# Patient Record
Sex: FEMALE | Race: OTHER | URBAN - METROPOLITAN AREA
[De-identification: names, ages, dates, MRNs, and addresses within clinical notes are randomized per-mention and may not be internally consistent; named-entity substitution may affect disease eponyms.]

---

## 2023-03-22 ENCOUNTER — EMERGENCY (EMERGENCY)
Facility: HOSPITAL | Age: 33
LOS: 1 days | Discharge: HOME | End: 2023-03-22
Attending: STUDENT IN AN ORGANIZED HEALTH CARE EDUCATION/TRAINING PROGRAM | Admitting: STUDENT IN AN ORGANIZED HEALTH CARE EDUCATION/TRAINING PROGRAM
Payer: COMMERCIAL

## 2023-03-22 VITALS
TEMPERATURE: 98 F | OXYGEN SATURATION: 97 % | HEIGHT: 64 IN | DIASTOLIC BLOOD PRESSURE: 74 MMHG | HEART RATE: 92 BPM | RESPIRATION RATE: 18 BRPM | WEIGHT: 119.93 LBS | SYSTOLIC BLOOD PRESSURE: 109 MMHG

## 2023-03-22 VITALS
TEMPERATURE: 98 F | DIASTOLIC BLOOD PRESSURE: 78 MMHG | RESPIRATION RATE: 18 BRPM | OXYGEN SATURATION: 100 % | HEART RATE: 73 BPM | SYSTOLIC BLOOD PRESSURE: 118 MMHG

## 2023-03-22 PROCEDURE — 99214 OFFICE O/P EST MOD 30 MIN: CPT

## 2023-03-22 PROCEDURE — 56405 I&D VULVA/PERINEAL ABSCESS: CPT

## 2023-03-22 PROCEDURE — 99284 EMERGENCY DEPT VISIT MOD MDM: CPT

## 2023-03-22 PROCEDURE — 99284 EMERGENCY DEPT VISIT MOD MDM: CPT | Mod: 25

## 2023-03-22 NOTE — CONSULT NOTE ADULT - SUBJECTIVE AND OBJECTIVE BOX
32y  LMP 2023 presents with a vulvar abscess worsening since . Patient states she firsr noted the cyst on  and it has been getting progressivley worse. Her boyfriends mother is a physician who prescribes her Doxy PO, which the patient has been taking daily fo 4 days. She reports 2 similar cysts in the past that resolved with sitz baths. She states today her pain became intolerable and that she was having difficulty with sitting and walking. Denies f/v, n/v, abd pain, vaginal bleeding, vaginal discharge, dysuria.     OBHx: vtop d&c x2   Gyn H/x:  LMP: 2023  Denies H/x of cysts, fibroids, endometriosis, STIs  PMH: denies  PSH: denies  Meds: none  NKDA       T(C): 36.7 (-23 @ 08:25), Max: 36.7 (-23 @ 08:25)  HR: 92 (- @ 08:25) (92 - 92)  BP: 109/74 (-23 @ 08:25) (109/74 - 109/74)  RR: 18 (-23 @ 08:25) (18 - 18)  SpO2: 97% (- @ 08:25) (97% - 97%)    PHYSICAL EXAM:   Gen: NAD  GI: soft, nontender, nondistended + BS, no rebound no guarding  : 4cm vulvar fluctuant and tender L vulvar abscess noted, no erythema or warmth or drainage   Ext: wnl        LABS:                  RADIOLOGY & ADDITIONAL STUDIES:   32y  LMP 2023 presents with a vulvar abscess worsening since . Patient states she firsr noted the cyst on  and it has been getting progressivley worse. Her boyfriends mother is a physician who prescribes her Doxy PO, which the patient has been taking daily fo 4 days. She reports 2 similar cysts in the past that resolved with sitz baths. She states today her pain became intolerable and that she was having difficulty with sitting and walking. Denies f/v, n/v, abd pain, vaginal bleeding, vaginal discharge, dysuria.     OBHx: vtop d&c x2   Gyn H/x:  LMP: 2023  Denies H/x of cysts, fibroids, endometriosis, STIs  PMH: denies  PSH: denies  Meds: none  NKDA       T(C): 36.7 (23 @ 08:25), Max: 36.7 (23 @ 08:25)  HR: 92 (23 @ 08:25) (92 - 92)  BP: 109/74 (- @ 08:25) (109/74 - 109/74)  RR: 18 (- @ 08:25) (18 - 18)  SpO2: 97% (- @ 08:25) (97% - 97%)    PHYSICAL EXAM:   Gen: NAD  GI: soft, nontender, nondistended + BS, no rebound no guarding  : 4cm vulvar fluctuant and tender L vulvar abscess noted, no erythema or warmth or drainage   Ext: wnl

## 2023-03-22 NOTE — ED PROVIDER NOTE - NS ED ROS FT
Constitutional: No fever or chills  Eyes: No discharge or drainage  Ears, Nose, Mouth, Throat: No nasal discharge, no sore throat  Cardiovascular: No chest pain, no palpitations  Respiratory: No shortness of breath, no cough  Gastrointestinal: No nausea or vomiting, no abdominal pain, no diarrhea or constipation  Musculoskeletal: No joint pain, no swelling  GYN: + labial swelling  Skin: No rashes or lesions  Neurological: No numbness, weakness, tingling, no headache  Psychiatric: No depression

## 2023-03-22 NOTE — ED ADULT NURSE NOTE - NSIMPLEMENTINTERV_GEN_ALL_ED
Implemented All Universal Safety Interventions:  [place] to call system. Call bell, personal items and telephone within reach. Instruct patient to call for assistance. Room bathroom lighting operational. Non-slip footwear when patient is off stretcher. Physically safe environment: no spills, clutter or unnecessary equipment. Stretcher in lowest position, wheels locked, appropriate side rails in place.

## 2023-03-22 NOTE — ED ADULT NURSE NOTE - OBJECTIVE STATEMENT
The patient is a 32y Female complaining of abscess. Pt reports "I have a Bartholin's cyst since last week but today is very painful and swollen", endorses white vaginal discharge, no bleeding. Pt was started on doxycycline on 3/19 by a family physician and has been taking Advil for pain, last took Advil today at 0730 am. Pt denies abd pain, urinary symptoms, F/C, N/V/D.

## 2023-03-22 NOTE — ED ADULT TRIAGE NOTE - CHIEF COMPLAINT QUOTE
Pt states "I was told I have a bartholin gland cyst, I woke up this morning and it suddenly became very painful."

## 2023-03-22 NOTE — ED PROVIDER NOTE - PATIENT PORTAL LINK FT
You can access the FollowMyHealth Patient Portal offered by [facility] by registering at the following website: [email or web address]. By joining [employer]’s FollowMyHealth portal, you will also be able to view your health information using other applications (apps) compatible with our system.

## 2023-03-22 NOTE — ED PROVIDER NOTE - CLINICAL SUMMARY MEDICAL DECISION MAKING FREE TEXT BOX
32-year-old female presenting with Bartholin cyst/abscess, no systemic signs of infection, GYN resident made aware, will see patient, will reassess.

## 2023-03-22 NOTE — CONSULT NOTE ADULT - ASSESSMENT
A/P: 33 yo P0 with L vulvar abscess   - Hemodynamically stable.   - No sign of systemic infection. No signs of spreading local infection beyond the abscess  - I&D procedure: L vulvar abscess was cleansed in an antiseptic manner with betadine. 3ml of 1% lido was injected for local anesthesia. A 5mm incision was man at the center of the abscess, and immediate drainage of purulent fluid was noted. Approximately 30 cc of purulent fluid was expressed until no further fluid able to be expressed. Pressure was placed for hemostasis. Patient tolerated procedure well.   - Patient for follow up in 1 -2 week outpatient (Faculty Practice: [facility] street [phone] or [facility] [address] [phone]). No Further abx treatment necessary   - Stable for discharge. Return infection/pain/bleeding precautions discussed     Dr. Mohan at bedside for procedure   Jaja STOCK

## 2023-03-22 NOTE — ED PROVIDER NOTE - NSFOLLOWUPCLINICS_GEN_ALL_ED_FT
Amb[facility] Unit - [facility]  OBGYN  [address] [address]  Phone: [phone]  Fax: [phone]  Follow Up Time: 4-6 Days

## 2023-03-22 NOTE — ED PROVIDER NOTE - PHYSICAL EXAMINATION
General: Well appearing, in no acute distress  HEENT: Normocephalic, atraumatic, extraocular movements intact  CV: Regular rate  Pulm: No respiratory distress, no tachypnea  Abd: Flat, no gross distension  Ext: warm and well perfused  Skin: No gross rashes or lesions  GYN: Large bartholin cyst/abscess on L inferior side, ttp  Neuro: Alert and oriented, moving all extremities

## 2023-03-22 NOTE — ED PROVIDER NOTE - OBJECTIVE STATEMENT
32-year-old female no medical problems presenting with a left-sided Bartholin cyst.  Patient endorsing swelling along labial area and pain and redness. Patient's partner's parent is a physician who prescribed patient PO Doxy, which patient has been on. Denies fever, chills, denies abnormal discharge or drainage. No trauma, no urinary complaints. ROS as above.

## 2023-03-23 DIAGNOSIS — N76.4 ABSCESS OF VULVA: ICD-10-CM

## 2023-03-23 DIAGNOSIS — N75.0 CYST OF BARTHOLIN'S GLAND: ICD-10-CM

## 2024-10-23 NOTE — ED PROVIDER NOTE - NSFOLLOWUPINSTRUCTIONS_ED_ALL_ED_FT
Please return for worsening symptoms, worsening pain, swelling, drainage, discharge, fever, chills. You may apply warm compresses to the area. Take tylenol or motrin as needed for pain. Please follow up with the GYN doctor.    I hope you feel better soon.    Sincerely,  Yeni Davies MD
[FreeTextEntry1] : - aEEG - Seizure precautions discussed - Valtoco 15 mg PRN seizure >4 minutes - Follow up after aEEG
[FreeTextEntry1] : - aEEG - Seizure precautions discussed - Valtoco 15 mg PRN seizure >4 minutes - Follow up after aEEG

## 2025-06-24 ENCOUNTER — EMERGENCY (EMERGENCY)
Facility: HOSPITAL | Age: 35
LOS: 1 days | End: 2025-06-24
Admitting: EMERGENCY MEDICINE
Payer: COMMERCIAL

## 2025-06-24 VITALS
DIASTOLIC BLOOD PRESSURE: 80 MMHG | RESPIRATION RATE: 18 BRPM | SYSTOLIC BLOOD PRESSURE: 118 MMHG | HEART RATE: 81 BPM | TEMPERATURE: 98 F | OXYGEN SATURATION: 96 %

## 2025-06-24 PROCEDURE — 99284 EMERGENCY DEPT VISIT MOD MDM: CPT

## 2025-06-24 RX ORDER — LIDOCAINE AND PRILOCAINE 25; 25 MG/G; MG/G
1 CREAM TOPICAL ONCE
Refills: 0 | Status: COMPLETED | OUTPATIENT
Start: 2025-06-24 | End: 2025-06-24

## 2025-06-24 RX ORDER — RABIES IMMUNE GLOBULIN (HUMAN) 150 [IU]/ML
1150 INJECTION INTRAMUSCULAR ONCE
Refills: 0 | Status: COMPLETED | OUTPATIENT
Start: 2025-06-24 | End: 2025-06-24

## 2025-06-24 RX ADMIN — Medication 1 MILLILITER(S): at 19:14

## 2025-06-24 RX ADMIN — RABIES IMMUNE GLOBULIN (HUMAN) 1150 UNIT(S): 150 INJECTION INTRAMUSCULAR at 19:12

## 2025-06-24 RX ADMIN — LIDOCAINE AND PRILOCAINE 1 APPLICATION(S): 25; 25 CREAM TOPICAL at 18:50

## 2025-06-24 NOTE — ED ADULT NURSE NOTE - AS PAIN REST
[FreeTextEntry1] : Urine dip is negative but urine culture is sent again.  I discussed that her pain is really more mid abdominal and unlikely to be secondary to UTI etc.  I also additionally discussed that she does not really have many pelvic organs as she has had a hysterectomy. 2 (mild pain)

## 2025-06-24 NOTE — ED PROVIDER NOTE - OBJECTIVE STATEMENT
Patient coming for rabies vaccination was stating she was bitten by a cat 3 days ago while in [place] on the bottom of her right foot.  Patient states she was seen by a doctor at the house she was staying at in [place].  He gave her a tetanus shot and antibiotics.  Now coming in for rabies vaccination.  Patient appearing well and vibrant.  Denies any fevers, chills, nausea, vomiting.

## 2025-06-24 NOTE — ED ADULT NURSE NOTE - NSFALLASSESSNEED_ED_ALL_ED
Spoke w/ dad who reports pt began vomiting lats night after dinner. Vomiting lasted until around midnight. Slight fever but currently afebrile sitting next to dad eating a cracker. Family is supposed to fly to [place] tomorrow morning. Dad requesting eval in office w/ sibling. Appt sched.      Future Appointments   Date Time Provider Department Center   6/2/2023 11:40 AM Charli Loaiza MD [id number]   6/14/2023  9:00 AM Charli Loaiza MD [id number]         no

## 2025-06-24 NOTE — ED PROVIDER NOTE - CLINICAL SUMMARY MEDICAL DECISION MAKING FREE TEXT BOX
Patient here for rabies vaccination.  Was bitten by cat in [place].  Will provide vaccination and schedule  Patient verbalized understanding of plan.  Will discharge return precautions

## 2025-06-24 NOTE — ED ADULT NURSE NOTE - CHIEF COMPLAINT
The patient is a 34y Female complaining of cat bite to bottom of foot 3 days ago. Wound closed, no bleeding, drainage or redness.

## 2025-06-24 NOTE — ED PROVIDER NOTE - NSFOLLOWUPINSTRUCTIONS_ED_ALL_ED_FT
Please Return for Vaccination on days 3, 7, 14 for repeat Rabies vaccinations     Follow up with your primary care doctor or clinics listed below if you do not have a doctor  Return immediately for any new or worsening symptoms or any new concerns

## 2025-06-24 NOTE — ED PROVIDER NOTE - PHYSICAL EXAMINATION
Physical Exam  GEN: Awake, alert, non-toxic appearing, NCAT  EYES: PERRL, full EOMI,  ENT: External inspection normal, normal voice, no oropharyngeal ulcerations/lesions/swelling  HEAD: atraumatic  NECK: FROM neck, supple, no meningismus, trachea midline, no JVD  MSK: FROM all 4 extremities, N/V intact,   SKIN: Color normal for race, warm and dry, no rash  -bite yosvany to the bottom of her right heel. No signs of infection   NEURO: Oriented x3, CN 2-12 grossly intact, normal motor, normal sensory

## 2025-06-24 NOTE — ED PROVIDER NOTE - PATIENT PORTAL LINK FT
You can access the FollowMyHealth Patient Portal offered by [facility] by registering at the following website: [email or web address]. By joining [employer]’s FollowMyHealth portal, you will also be able to view your health information using other applications (apps) compatible with our system.

## 2025-06-26 DIAGNOSIS — S91.351A OPEN BITE, RIGHT FOOT, INITIAL ENCOUNTER: ICD-10-CM
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
## 2025-06-26 DIAGNOSIS — Z23 ENCOUNTER FOR IMMUNIZATION: ICD-10-CM
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
